# Patient Record
Sex: MALE | Race: WHITE
[De-identification: names, ages, dates, MRNs, and addresses within clinical notes are randomized per-mention and may not be internally consistent; named-entity substitution may affect disease eponyms.]

---

## 2017-12-21 ENCOUNTER — HOSPITAL ENCOUNTER (OUTPATIENT)
Dept: HOSPITAL 47 - ORWHC2ENDO | Age: 67
Discharge: HOME | End: 2017-12-21
Payer: MEDICARE

## 2017-12-21 VITALS — RESPIRATION RATE: 16 BRPM | TEMPERATURE: 98.7 F

## 2017-12-21 VITALS — BODY MASS INDEX: 26.4 KG/M2

## 2017-12-21 VITALS — DIASTOLIC BLOOD PRESSURE: 70 MMHG | HEART RATE: 55 BPM | SYSTOLIC BLOOD PRESSURE: 122 MMHG

## 2017-12-21 DIAGNOSIS — K57.30: ICD-10-CM

## 2017-12-21 DIAGNOSIS — Z79.82: ICD-10-CM

## 2017-12-21 DIAGNOSIS — Z12.11: Primary | ICD-10-CM

## 2017-12-21 DIAGNOSIS — I10: ICD-10-CM

## 2017-12-21 DIAGNOSIS — Z79.899: ICD-10-CM

## 2017-12-21 DIAGNOSIS — Z86.010: ICD-10-CM

## 2017-12-21 PROCEDURE — 45378 DIAGNOSTIC COLONOSCOPY: CPT

## 2017-12-21 NOTE — P.PCN
Date of Procedure: 12/21/17


Procedure(s) Performed: 


BRIEF HISTORY: Patient is a 67-year-old pleasant male, scheduled for an 

elective colonoscopy as a part of evaluation of prior history of colon polyps.  

His last colonoscopy was 5 years ago.





PROCEDURE PERFORMED: Colonoscopy. 





PREOPERATIVE DIAGNOSIS: History of colon polyps. 





IV sedation per Anesthesia. 





PROCEDURE: After informed consent was obtained, the patient, was brought into 

the endoscopy unit. IV sedation was administered by Anesthesia under continuous 

monitoring.  Digital rectal examination was normal. Initially the Olympus CF-

160 flexible video colonoscope was then inserted in the rectum, gradually 

advanced into the cecum without any difficulty. Careful examination was 

performed as the scope was gradually being withdrawn. Ileocecal valve and the 

appendiceal orifice were visualized and appeared normal.  Prep was excellent. 

Mucosa of the cecum, ascending colon, transverse colon, descending colon, 

sigmoid colon, and rectum appeared normal. Retroflexion was performed in the 

rectum and no lesions were seen.  Moderate left-sided diverticulosis seen.  The 

patient tolerated the procedure well. 





IMPRESSION: 


Normal-appearing colon from rectum to cecum with no evidence of colorectal 

neoplasia


Moderate sigmoid diverticulosis.





RECOMMENDATIONS:  Findings of this examination were discussed with the patient 

well as his family.  He was advised to have a repeat screening colonoscopy in 5 

years from now because of the prior history of colon polyps.

## 2023-07-06 ENCOUNTER — HOSPITAL ENCOUNTER (OUTPATIENT)
Dept: HOSPITAL 47 - LABWHC1 | Age: 73
Discharge: HOME | End: 2023-07-06
Attending: INTERNAL MEDICINE
Payer: MEDICARE

## 2023-07-06 DIAGNOSIS — R53.83: Primary | ICD-10-CM

## 2023-07-06 LAB
ALBUMIN SERPL-MCNC: 4.6 D/DL (ref 3.8–4.9)
ALBUMIN/GLOB SERPL: 2 RATIO (ref 1.6–3.17)
ALP SERPL-CCNC: 54 U/L (ref 41–126)
ALT SERPL-CCNC: 14 U/L (ref 10–49)
ANION GAP SERPL CALC-SCNC: 10.2 MMOL/L (ref 4–12)
AST SERPL-CCNC: 16 U/L (ref 14–35)
BUN SERPL-SCNC: 22.5 MG/DL (ref 9–27)
BUN/CREAT SERPL: 18.75 RATIO (ref 12–20)
CALCIUM SPEC-MCNC: 9.7 MG/DL (ref 8.7–10.3)
CHLORIDE SERPL-SCNC: 106 MMOL/L (ref 96–109)
CO2 SERPL-SCNC: 26.8 MMOL/L (ref 21.6–31.8)
GLOBULIN SER CALC-MCNC: 2.3 D/DL (ref 1.6–3.3)
GLUCOSE SERPL-MCNC: 94 MG/DL (ref 70–110)
POTASSIUM SERPL-SCNC: 4.8 MMOL/L (ref 3.5–5.5)
PROT SERPL-MCNC: 6.9 D/DL (ref 6.2–8.2)
SODIUM SERPL-SCNC: 143 MMOL/L (ref 135–145)
T4 FREE SERPL-MCNC: 0.93 NG/DL (ref 0.8–1.8)
VIT B12 SERPL-MCNC: 546 PG/ML (ref 200–944)

## 2023-07-06 PROCEDURE — 84403 ASSAY OF TOTAL TESTOSTERONE: CPT

## 2023-07-06 PROCEDURE — 84146 ASSAY OF PROLACTIN: CPT

## 2023-07-06 PROCEDURE — 82533 TOTAL CORTISOL: CPT

## 2023-07-06 PROCEDURE — 84480 ASSAY TRIIODOTHYRONINE (T3): CPT

## 2023-07-06 PROCEDURE — 84439 ASSAY OF FREE THYROXINE: CPT

## 2023-07-06 PROCEDURE — 80053 COMPREHEN METABOLIC PANEL: CPT

## 2023-07-06 PROCEDURE — 84445 ASSAY OF TSI GLOBULIN: CPT

## 2023-07-06 PROCEDURE — 82607 VITAMIN B-12: CPT

## 2023-07-06 PROCEDURE — 36415 COLL VENOUS BLD VENIPUNCTURE: CPT

## 2023-07-06 PROCEDURE — 84443 ASSAY THYROID STIM HORMONE: CPT
